# Patient Record
Sex: MALE | HISPANIC OR LATINO | Employment: FULL TIME | URBAN - METROPOLITAN AREA
[De-identification: names, ages, dates, MRNs, and addresses within clinical notes are randomized per-mention and may not be internally consistent; named-entity substitution may affect disease eponyms.]

---

## 2018-11-11 ENCOUNTER — APPOINTMENT (OUTPATIENT)
Dept: RADIOLOGY | Facility: MEDICAL CENTER | Age: 48
End: 2018-11-11
Attending: EMERGENCY MEDICINE
Payer: MEDICAID

## 2018-11-11 ENCOUNTER — HOSPITAL ENCOUNTER (EMERGENCY)
Facility: MEDICAL CENTER | Age: 48
End: 2018-11-11
Attending: EMERGENCY MEDICINE
Payer: MEDICAID

## 2018-11-11 VITALS
WEIGHT: 175.49 LBS | HEIGHT: 72 IN | BODY MASS INDEX: 23.77 KG/M2 | DIASTOLIC BLOOD PRESSURE: 97 MMHG | RESPIRATION RATE: 17 BRPM | OXYGEN SATURATION: 94 % | TEMPERATURE: 98.5 F | HEART RATE: 88 BPM | SYSTOLIC BLOOD PRESSURE: 138 MMHG

## 2018-11-11 LAB
ALBUMIN SERPL BCP-MCNC: 4.3 G/DL (ref 3.2–4.9)
ALBUMIN/GLOB SERPL: 1.8 G/DL
ALP SERPL-CCNC: 54 U/L (ref 30–99)
ALT SERPL-CCNC: 16 U/L (ref 2–50)
ANION GAP SERPL CALC-SCNC: 8 MMOL/L (ref 0–11.9)
AST SERPL-CCNC: 21 U/L (ref 12–45)
BASOPHILS # BLD AUTO: 0.2 % (ref 0–1.8)
BASOPHILS # BLD: 0.02 K/UL (ref 0–0.12)
BILIRUB SERPL-MCNC: 0.6 MG/DL (ref 0.1–1.5)
BUN SERPL-MCNC: 21 MG/DL (ref 8–22)
CALCIUM SERPL-MCNC: 9.4 MG/DL (ref 8.5–10.5)
CHLORIDE SERPL-SCNC: 109 MMOL/L (ref 96–112)
CO2 SERPL-SCNC: 21 MMOL/L (ref 20–33)
CREAT SERPL-MCNC: 0.89 MG/DL (ref 0.5–1.4)
EKG IMPRESSION: NORMAL
EOSINOPHIL # BLD AUTO: 0.03 K/UL (ref 0–0.51)
EOSINOPHIL NFR BLD: 0.2 % (ref 0–6.9)
ERYTHROCYTE [DISTWIDTH] IN BLOOD BY AUTOMATED COUNT: 42.8 FL (ref 35.9–50)
GLOBULIN SER CALC-MCNC: 2.4 G/DL (ref 1.9–3.5)
GLUCOSE SERPL-MCNC: 109 MG/DL (ref 65–99)
HCT VFR BLD AUTO: 46.9 % (ref 42–52)
HGB BLD-MCNC: 16.4 G/DL (ref 14–18)
IMM GRANULOCYTES # BLD AUTO: 0.06 K/UL (ref 0–0.11)
IMM GRANULOCYTES NFR BLD AUTO: 0.5 % (ref 0–0.9)
LYMPHOCYTES # BLD AUTO: 1.04 K/UL (ref 1–4.8)
LYMPHOCYTES NFR BLD: 8.1 % (ref 22–41)
MCH RBC QN AUTO: 32.4 PG (ref 27–33)
MCHC RBC AUTO-ENTMCNC: 35 G/DL (ref 33.7–35.3)
MCV RBC AUTO: 92.7 FL (ref 81.4–97.8)
MONOCYTES # BLD AUTO: 0.71 K/UL (ref 0–0.85)
MONOCYTES NFR BLD AUTO: 5.5 % (ref 0–13.4)
NEUTROPHILS # BLD AUTO: 10.97 K/UL (ref 1.82–7.42)
NEUTROPHILS NFR BLD: 85.5 % (ref 44–72)
NRBC # BLD AUTO: 0 K/UL
NRBC BLD-RTO: 0 /100 WBC
PLATELET # BLD AUTO: 120 K/UL (ref 164–446)
PMV BLD AUTO: 12.4 FL (ref 9–12.9)
POTASSIUM SERPL-SCNC: 3.8 MMOL/L (ref 3.6–5.5)
PROT SERPL-MCNC: 6.7 G/DL (ref 6–8.2)
RBC # BLD AUTO: 5.06 M/UL (ref 4.7–6.1)
SODIUM SERPL-SCNC: 138 MMOL/L (ref 135–145)
WBC # BLD AUTO: 12.8 K/UL (ref 4.8–10.8)

## 2018-11-11 PROCEDURE — 700102 HCHG RX REV CODE 250 W/ 637 OVERRIDE(OP): Performed by: EMERGENCY MEDICINE

## 2018-11-11 PROCEDURE — 96365 THER/PROPH/DIAG IV INF INIT: CPT

## 2018-11-11 PROCEDURE — 93005 ELECTROCARDIOGRAM TRACING: CPT | Performed by: EMERGENCY MEDICINE

## 2018-11-11 PROCEDURE — A9270 NON-COVERED ITEM OR SERVICE: HCPCS | Performed by: EMERGENCY MEDICINE

## 2018-11-11 PROCEDURE — 96375 TX/PRO/DX INJ NEW DRUG ADDON: CPT

## 2018-11-11 PROCEDURE — 80053 COMPREHEN METABOLIC PANEL: CPT

## 2018-11-11 PROCEDURE — 99285 EMERGENCY DEPT VISIT HI MDM: CPT

## 2018-11-11 PROCEDURE — 85025 COMPLETE CBC W/AUTO DIFF WBC: CPT

## 2018-11-11 PROCEDURE — 73120 X-RAY EXAM OF HAND: CPT | Mod: RT

## 2018-11-11 PROCEDURE — 96376 TX/PRO/DX INJ SAME DRUG ADON: CPT

## 2018-11-11 PROCEDURE — 700111 HCHG RX REV CODE 636 W/ 250 OVERRIDE (IP): Performed by: EMERGENCY MEDICINE

## 2018-11-11 PROCEDURE — 700105 HCHG RX REV CODE 258: Performed by: EMERGENCY MEDICINE

## 2018-11-11 RX ORDER — MORPHINE SULFATE 4 MG/ML
4 INJECTION, SOLUTION INTRAMUSCULAR; INTRAVENOUS ONCE
Status: COMPLETED | OUTPATIENT
Start: 2018-11-11 | End: 2018-11-11

## 2018-11-11 RX ORDER — ASPIRIN 300 MG/1
600 SUPPOSITORY RECTAL ONCE
Status: COMPLETED | OUTPATIENT
Start: 2018-11-11 | End: 2018-11-11

## 2018-11-11 RX ORDER — CEFAZOLIN SODIUM 1 G/50ML
1 INJECTION, SOLUTION INTRAVENOUS ONCE
Status: COMPLETED | OUTPATIENT
Start: 2018-11-11 | End: 2018-11-11

## 2018-11-11 RX ORDER — SODIUM CHLORIDE 9 MG/ML
INJECTION, SOLUTION INTRAVENOUS CONTINUOUS
Status: DISCONTINUED | OUTPATIENT
Start: 2018-11-11 | End: 2018-11-11 | Stop reason: HOSPADM

## 2018-11-11 RX ADMIN — MORPHINE SULFATE 4 MG: 4 INJECTION INTRAVENOUS at 16:57

## 2018-11-11 RX ADMIN — MORPHINE SULFATE 4 MG: 4 INJECTION INTRAVENOUS at 15:56

## 2018-11-11 RX ADMIN — CEFAZOLIN SODIUM 1 G: 1 INJECTION, SOLUTION INTRAVENOUS at 16:00

## 2018-11-11 RX ADMIN — SODIUM CHLORIDE: 9 INJECTION, SOLUTION INTRAVENOUS at 16:45

## 2018-11-11 RX ADMIN — ASPIRIN 600 MG: 300 SUPPOSITORY RECTAL at 16:41

## 2018-11-11 NOTE — ED TRIAGE NOTES
Pt comes in complaining of a full amputation of his right thumb. Thumb placed on ice. 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

## 2018-11-11 NOTE — ED PROVIDER NOTES
ED Provider Note    CHIEF COMPLAINT  Chief Complaint   Patient presents with   • Amputation     R thumb, bull roping.        HPI  Sy Mera is a 48 y.o. male who presents for evaluation of hand injury.  The patient is right-handed he sustained a amputation of his right thumb while bowling roping.  Injury occurred approximately 15 minutes prior to arrival in the emergency department.  He complains of pain localized to the site of injury.  He has no other complaints.  He is healthy generally.    REVIEW OF SYSTEMS  See HPI for further details.  Patient denies striking his head he has no back pain chest pain abdominal pain and all other systems reviewed and negative except as noted above    PAST MEDICAL HISTORY  Past Medical History:   Diagnosis Date   • Pain     back,head,pain scale 9       FAMILY HISTORY  History reviewed. No pertinent family history.    SOCIAL HISTORY  Social History     Social History   • Marital status:      Spouse name: N/A   • Number of children: N/A   • Years of education: N/A     Social History Main Topics   • Smoking status: Former Smoker   • Smokeless tobacco: Former User     Types: Chew      Comment: quit one heath ago   • Alcohol use 6.0 oz/week     12 Cans of beer per week      Comment: weekly   • Drug use: No   • Sexual activity: Not on file     Other Topics Concern   • Not on file     Social History Narrative   • No narrative on file       SURGICAL HISTORY  Past Surgical History:   Procedure Laterality Date   • SIGMOIDOSCOPY FLEX  2/4/2015    Performed by aDne Victor M.D. at ENDOSCOPY [facility]   • CERVICAL DISK AND FUSION ANTERIOR  9/28/2009    Performed by RICO EDMONDS at SURGERY [facility]S   • TONSILLECTOMY         CURRENT MEDICATIONS  Home Medications     Reviewed by Shelbie Negrete, Student (Nurse Apprentice) on 11/11/18 at 1500  Med List Status: Complete   Medication Last Dose Status        Patient Jam Taking any Medications                         ALLERGIES  Allergies   Allergen Reactions   • Etodolac Rash   • Skelaxin [Metaxalone] Rash   • Tramadol Rash       PHYSICAL EXAM  VITAL SIGNS: /97   Pulse (!) 121   Temp 36.9 °C (98.5 °F) (Temporal)   Resp 17   Ht 1.829 m (6')   Wt 79.6 kg (175 lb 7.8 oz)   SpO2 92%   BMI 23.80 kg/m²   Constitutional :  Well developed, Well nourished, No acute distress, Non-toxic appearance.   HENT: Head is atraumatic normocephalic  Eyes: Normal-appearing nonicteric  Neck: Normal range of motion, No tenderness, Supple, No stridor.   Lymphatic: No cervical adenopathy.   Cardiovascular: Normal heart rate, Normal rhythm, No murmurs, No rubs, No gallops.   Thorax & Lungs: Equal breath sounds bilaterally no rales rhonchi  Skin: Warm, Dry, No erythema, No rash.   Abdomen soft nontender no distention  Neurologically he is awake he is alert he has no focal neurological finding  Extremities he appears to have a amputation of the thumb at the base of the thumb PIP joint of right hand the amputated thumb is storage per protocol    DX-HAND 2- RIGHT   Final Result      Amputation at the level of the first proximal phalanx base          Results for orders placed or performed during the hospital encounter of 11/11/18   CBC WITH DIFFERENTIAL   Result Value Ref Range    WBC 12.8 (H) 4.8 - 10.8 K/uL    RBC 5.06 4.70 - 6.10 M/uL    Hemoglobin 16.4 14.0 - 18.0 g/dL    Hematocrit 46.9 42.0 - 52.0 %    MCV 92.7 81.4 - 97.8 fL    MCH 32.4 27.0 - 33.0 pg    MCHC 35.0 33.7 - 35.3 g/dL    RDW 42.8 35.9 - 50.0 fL    Platelet Count 120 (L) 164 - 446 K/uL    MPV 12.4 9.0 - 12.9 fL    Neutrophils-Polys 85.50 (H) 44.00 - 72.00 %    Lymphocytes 8.10 (L) 22.00 - 41.00 %    Monocytes 5.50 0.00 - 13.40 %    Eosinophils 0.20 0.00 - 6.90 %    Basophils 0.20 0.00 - 1.80 %    Immature Granulocytes 0.50 0.00 - 0.90 %    Nucleated RBC 0.00 /100 WBC    Neutrophils (Absolute) 10.97 (H) 1.82 - 7.42 K/uL    Lymphs (Absolute) 1.04 1.00 - 4.80 K/uL     Monos (Absolute) 0.71 0.00 - 0.85 K/uL    Eos (Absolute) 0.03 0.00 - 0.51 K/uL    Baso (Absolute) 0.02 0.00 - 0.12 K/uL    Immature Granulocytes (abs) 0.06 0.00 - 0.11 K/uL    NRBC (Absolute) 0.00 K/uL   CMP   Result Value Ref Range    Sodium 138 135 - 145 mmol/L    Potassium 3.8 3.6 - 5.5 mmol/L    Chloride 109 96 - 112 mmol/L    Co2 21 20 - 33 mmol/L    Anion Gap 8.0 0.0 - 11.9    Glucose 109 (H) 65 - 99 mg/dL    Bun 21 8 - 22 mg/dL    Creatinine 0.89 0.50 - 1.40 mg/dL    Calcium 9.4 8.5 - 10.5 mg/dL    AST(SGOT) 21 12 - 45 U/L    ALT(SGPT) 16 2 - 50 U/L    Alkaline Phosphatase 54 30 - 99 U/L    Total Bilirubin 0.6 0.1 - 1.5 mg/dL    Albumin 4.3 3.2 - 4.9 g/dL    Total Protein 6.7 6.0 - 8.2 g/dL    Globulin 2.4 1.9 - 3.5 g/dL    A-G Ratio 1.8 g/dL   ESTIMATED GFR   Result Value Ref Range    GFR If African American >60 >60 mL/min/1.73 m 2    GFR If Non African American >60 >60 mL/min/1.73 m 2   EKG   Result Value Ref Range    Report       [facility] Emergency Dept.    Test Date:  2018  Pt Name:    JU COURTNEY         Department: ER  MRN:        [id number]                      Room:       [facility]  Gender:     Male                         Technician: [id number]  :        1970                   Requested By:ROSALEE CONWAY  Order #:    [id number]                    Reading MD:    Measurements  Intervals                                Axis  Rate:       80                           P:          72  MT:         160                          QRS:        54  QRSD:       98                           T:          44  QT:         352  QTc:        406    Interpretive Statements  SINUS RHYTHM  ARTIFACT IN LEAD(S) III,aVF,V1  Compared to ECG 2009 09:40:15  Sinus bradycardia no longer present  ST (T wave) deviation no longer present       Twelve-lead tracing sinus rhythm rate 80 normal ST segments key intervals normal impression is normal EKG    DX-HAND 2- RIGHT   Final Result      Amputation  at the level of the first proximal phalanx base          COURSE & MEDICAL DECISION MAKING  Pertinent Labs & Imaging studies reviewed. (See chart for details)  The patient is presenting with both amputation of his right thumb he is right-hand dominant.  The patient's injury was discussed with Dr. Gurrola from [facility] in [place] who are specialists in microsurgery reimplantation.  I will was able to speak to Dr. Gurrola approximately 1630 hrs.  He has consented to have the patient transported to [facility] for evaluation and treatment possible reimplantation of the thumb although the success of this procedure is limited in a row present injury which the patient appears to have.  The patient has consented to transfer.  Baseline lab EKG has been obtained.  Aspirin has been given to the patient per protocol from [facility] 650 mg rectally.  The amputated portion of the thumb is stored per their recommendation.  Transfer will be initiated as soon as possible and he is given Ancef for antibiotic protocol tetanus was within the last 5 years.  He has been given morphine IV for pain with Zofran.        FINAL IMPRESSION  1.  Right thumb amputation  2.   3.      Electronically signed by: Emanuel Ricardo, 11/11/2018

## 2018-11-12 NOTE — ED NOTES
Pt updated with the poc. Pt kept NPO. Labs collected and sent.  Dressing in right hand w/moderate bleeding, dressing reinforced. Rue elevated on pillow.  IV antibiotics infusing.

## 2018-11-12 NOTE — DISCHARGE PLANNING
Referral to [facility] in [place]. ([phone]) per Dr. Ricardo request.   Dr. Gurrola is accepting MD  at [facility] - ER.  ([phone])   Dr. Gurrola and Dr. Ricardo have discussed plan of care.     Dr. Ricardo to give report to Dr. Martínez at [facility].    [facility] completed, records copied, imaging on disk.  Pt aware and agreeable.   [facility] to transfer per [facility] dispatch at 1700.   Mirna RN at bedside to call report to above ER number.    No other needs verbalized/id'd by pt/MD/staff.    [facility]  [phone]

## 2018-11-12 NOTE — ED NOTES
Phone report given to receiving RN at [facility].   Pt en route via [facility] in stable condition.  NAD, VSS.   Amputated thumb in dry dressing and ziplock over ice per protocol. Thumb and belongings in possession of pt upon transfer.

## 2018-11-12 NOTE — ED NOTES
Protocol for Traumatic Amputation followed:   *Start Large Bore IV in unaffected limb and keep open with Normal Saline.  *Administered cefazolin (Ancef of Kefzol) 1gm, if not allergic.  *Administer tetanus toxoid 0.5 cc IM   *GIVE NOTHING BY MOUTH  *Give 10 grain aspirin rectal suppository  *xray extremity, CBC, CMP. EKG  *Apply dry sterile dressing/bulky dressing.  *elevate extremity  *Wrap amp part in dry gauze and place in zip-loc bag, place bag on ice.    These protocols are done and followed

## 2019-08-30 ENCOUNTER — HOSPITAL ENCOUNTER (OUTPATIENT)
Dept: RADIOLOGY | Facility: MEDICAL CENTER | Age: 49
End: 2019-08-30
Attending: INTERNAL MEDICINE

## 2019-08-30 DIAGNOSIS — D69.6 THROMBOCYTOPENIA (HCC): ICD-10-CM

## 2024-06-04 ENCOUNTER — OFFICE VISIT (OUTPATIENT)
Dept: MEDICAL GROUP | Facility: CLINIC | Age: 54
End: 2024-06-04
Payer: COMMERCIAL

## 2024-06-04 VITALS
OXYGEN SATURATION: 94 % | DIASTOLIC BLOOD PRESSURE: 71 MMHG | BODY MASS INDEX: 24.22 KG/M2 | SYSTOLIC BLOOD PRESSURE: 110 MMHG | HEART RATE: 70 BPM | WEIGHT: 173 LBS | HEIGHT: 71 IN | TEMPERATURE: 97.9 F

## 2024-06-04 DIAGNOSIS — Z30.09 VASECTOMY EVALUATION: ICD-10-CM

## 2024-06-04 PROCEDURE — 99999 PR NO CHARGE: CPT | Performed by: FAMILY MEDICINE

## 2024-06-04 RX ORDER — DIAZEPAM 5 MG/1
TABLET ORAL
Qty: 1 TABLET | Refills: 0 | Status: SHIPPED | OUTPATIENT
Start: 2024-06-04 | End: 2024-08-08

## 2024-06-04 ASSESSMENT — PATIENT HEALTH QUESTIONNAIRE - PHQ9: CLINICAL INTERPRETATION OF PHQ2 SCORE: 0

## 2024-06-04 NOTE — PROGRESS NOTES
"   Subjective:     Chief Complaint   Patient presents with    Surgery     Vasectomy consultation      Sy Mera is a 54 y.o. male here today for     Problem   Vasectomy Evaluation    Sy is here for pre-op consult for vasectomy, via the [facility] referral program.  Understands procedure and permanence.            Current medicines (including changes today)  Current Outpatient Medications   Medication Sig Dispense Refill    diazePAM (VALIUM) 5 MG Tab Take one hour before procedure 1 Tablet 0     No current facility-administered medications for this visit.       Social History     Tobacco Use    Smoking status: Former    Smokeless tobacco: Former     Types: Chew    Tobacco comments:     quit one heath ago   Vaping Use    Vaping status: Never Used   Substance Use Topics    Alcohol use: Yes     Alcohol/week: 6.0 oz     Types: 12 Cans of beer per week     Comment: weekly    Drug use: No     Past Medical History:   Diagnosis Date    Pain     back,head,pain scale 9      No family history on file.      Objective:     Vitals:    06/04/24 1125   BP: 110/71   BP Location: Left arm   Patient Position: Sitting   BP Cuff Size: Adult   Pulse: 70   Temp: 36.6 °C (97.9 °F)   TempSrc: Temporal   SpO2: 94%   Weight: 78.5 kg (173 lb)   Height: 1.81 m (5' 11.26\")     Body mass index is 23.95 kg/m².     Physical Exam:   Gen: Well developed, well nourished in no acute distress.   Skin: Pink, warm, and dry  HEENT: conjunctiva non-injected, sclera non-icteric. EOMs intact.   Nasal mucosa without edema nor erythema.   Pinna normal.    Oral mucous membranes pink and moist with no lesions.  Neck: Supple, trachea midline. No adenopathy or masses in the neck or supraclavicular regions.  Lungs: Effort is normal. Clear to auscultation bilaterally with good excursion.  CV: regular rate and rhythm, no murmurs  Abdomen: soft, nontender, + BS. No HSM.  No CVAT  Genital: testes descended bilat, no varicocele, hydrocele or hernia  Ext: no " edema, color normal, vascularity normal, temperature normal  Alert and oriented Eye contact is good, speech goal directed, affect calm    Assessment and Plan:   No problem-specific Assessment & Plan notes found for this encounter.      Followup: Return for procedure.    Yifan Marion M.D.

## 2024-06-18 ENCOUNTER — OFFICE VISIT (OUTPATIENT)
Dept: MEDICAL GROUP | Facility: CLINIC | Age: 54
End: 2024-06-18
Payer: COMMERCIAL

## 2024-06-18 VITALS
DIASTOLIC BLOOD PRESSURE: 70 MMHG | SYSTOLIC BLOOD PRESSURE: 115 MMHG | BODY MASS INDEX: 23.3 KG/M2 | WEIGHT: 172 LBS | HEART RATE: 72 BPM | TEMPERATURE: 97.6 F | HEIGHT: 72 IN | RESPIRATION RATE: 18 BRPM | OXYGEN SATURATION: 97 %

## 2024-06-18 DIAGNOSIS — Z30.2 ENCOUNTER FOR VASECTOMY: ICD-10-CM

## 2024-06-18 PROCEDURE — 55250 REMOVAL OF SPERM DUCT(S): CPT | Performed by: FAMILY MEDICINE

## 2024-06-18 RX ORDER — AMOXICILLIN 500 MG/1
CAPSULE ORAL
COMMUNITY
Start: 2024-04-08

## 2024-06-18 RX ORDER — IBUPROFEN 600 MG/1
600 TABLET ORAL
COMMUNITY
Start: 2024-04-08

## 2024-06-18 RX ORDER — CHLORHEXIDINE GLUCONATE ORAL RINSE 1.2 MG/ML
SOLUTION DENTAL
COMMUNITY
Start: 2024-04-09

## 2024-06-18 NOTE — PROCEDURES
Vasectomy Visit Note  PRE-OP DIAGNOSIS: Desires Elective Sterilization   POST-OP DIAGNOSIS: Same   PROCEDURE: Elective Bilateral Vasectomy   Resident and Assisting Physician: MD Americo PhD  Supervising and Performing Physician: MD Sanam   ANESTHESIA: (select one) Lidocaine 1.5%   Total amount used: 10mL     INDICATIONS:     This gentleman desires elective sterilization. He was counseled  regarding the risks, alternatives, and benefits of male sterilization by  vasectomy. He was informed of the risks of the procedure, including but  not limited to failure of the procedure to produce sterility, the risks  of bleeding, infection, and injury to scrotal contents. All questions  were answered in the pre-vasectomy conference and the required [facility] consent form was signed. No guarantees were given or implied.  A time out was taken prior to the procedure.      PROCEDURE:     The patient was laid supine on the procedure table. He was sterilely  prepped and draped in the usual fashion. The vasa were identified  bilaterally. The left vas was grasped using the three-finger technique.  Local anesthesia with a 27 gauge needle was applied to the skin in the  midline scrotum and to the left vas and  surrounding tissue. A vas fixing forcep was used to grasp the vas  through the scrotal skin. A vas dissecting instrument was then used to  acosta the skin and down through the fascia. The vas was then identified  and delivered through the incision. The surrounding vassal tissue was  incised in the midline in a vertical fashion to reveal the vas. The vas  was grasped with a vas forcep and delivered out of the fascia. The vas  was distally and proximally grasped.      The intervening segment of approximately 2 cm was excised  and sent for pathologic review. The lumen of the vas were sealed with  thermal fine wire cautery. The proximal vas was then closed over with  fascia in a fascial interposition technique. Small surgical  clips were placed on the distal and proximal ends of the vas and the intervening segment of approximately 2 cm was excised.       The right vas was attended to in the same fashion as the left vas after  local anesthesia was applied to the vas and surrounding tissue. All  bleeding was controlled. The scrotal fascia was allowed  to close by primary intention. Sterile  dressings were applied and the patient was sent home with standard  post-vasectomy instructions, including instructions to return with a  semen sample for analysis after 10+ ejaculations.            Post op instructions reviewed with pt and adult son (after permission granted).  Handout given. Offered f/u 1 week offered, declined.

## 2024-07-02 ENCOUNTER — OFFICE VISIT (OUTPATIENT)
Dept: MEDICAL GROUP | Facility: CLINIC | Age: 54
End: 2024-07-02
Payer: COMMERCIAL

## 2024-07-02 VITALS
DIASTOLIC BLOOD PRESSURE: 74 MMHG | TEMPERATURE: 98 F | HEART RATE: 70 BPM | HEIGHT: 72 IN | OXYGEN SATURATION: 98 % | BODY MASS INDEX: 23.3 KG/M2 | SYSTOLIC BLOOD PRESSURE: 128 MMHG | WEIGHT: 172 LBS

## 2024-07-02 DIAGNOSIS — Z13.9 SCREENING DUE: ICD-10-CM

## 2024-07-02 DIAGNOSIS — Z98.52 STATUS POST VASECTOMY: ICD-10-CM

## 2024-07-02 DIAGNOSIS — Z23 IMMUNIZATION DUE: ICD-10-CM

## 2024-07-02 PROCEDURE — 3074F SYST BP LT 130 MM HG: CPT

## 2024-07-02 PROCEDURE — 3078F DIAST BP <80 MM HG: CPT

## 2024-07-08 ENCOUNTER — HOSPITAL ENCOUNTER (OUTPATIENT)
Facility: MEDICAL CENTER | Age: 54
End: 2024-07-08
Attending: FAMILY MEDICINE
Payer: COMMERCIAL

## 2024-07-08 DIAGNOSIS — Z30.2 ENCOUNTER FOR VASECTOMY: ICD-10-CM

## 2024-07-08 PROCEDURE — 89321 SEMEN ANAL SPERM DETECTION: CPT

## 2024-07-10 LAB
SPECIMEN VOL SMN: 3 ML
SPERM P VAS SMN QL MICRO: PRESENT

## 2024-09-10 ENCOUNTER — HOSPITAL ENCOUNTER (OUTPATIENT)
Facility: MEDICAL CENTER | Age: 54
End: 2024-09-10
Attending: STUDENT IN AN ORGANIZED HEALTH CARE EDUCATION/TRAINING PROGRAM
Payer: COMMERCIAL

## 2024-09-10 DIAGNOSIS — Z98.52 STATUS POST VASECTOMY: ICD-10-CM

## 2024-09-10 PROCEDURE — 89321 SEMEN ANAL SPERM DETECTION: CPT

## 2024-09-10 NOTE — PROGRESS NOTES
[facility]   NOTE    NAME: Sy Mera  MRN: [id number]    DATE OF SERVICE: 9/10/24    Patient encountered at clinic lab today. Was supposed to give new semen analysis sample, however was not ordered by Dr. Marion after positive sample last visit.     At this time have ordered study for patient.     Ezra Weiss M.D.

## 2024-09-12 LAB
SPECIMEN VOL SMN: 3.1 ML
SPERM P VAS SMN QL MICRO: PRESENT

## 2024-09-16 DIAGNOSIS — Z98.52 STATUS POST VASECTOMY: ICD-10-CM
